# Patient Record
Sex: FEMALE | Race: WHITE | URBAN - METROPOLITAN AREA
[De-identification: names, ages, dates, MRNs, and addresses within clinical notes are randomized per-mention and may not be internally consistent; named-entity substitution may affect disease eponyms.]

---

## 2018-09-04 ENCOUNTER — HOSPITAL ENCOUNTER (OUTPATIENT)
Dept: LAB | Age: 28
Discharge: HOME | End: 2018-09-04

## 2018-09-04 PROCEDURE — 87389 HIV-1 AG W/HIV-1&-2 AB AG IA: CPT | Performed by: INTERNAL MEDICINE

## 2018-09-05 LAB
HIV 1+2 AB+HIV1 P24 AG SERPL QL IA: NONREACTIVE
HIV12 RESULT COMMENT, HHIVC: NORMAL

## 2019-02-21 ENCOUNTER — TELEPHONE (OUTPATIENT)
Dept: OBGYN CLINIC | Age: 29
End: 2019-02-21

## 2019-02-21 NOTE — TELEPHONE ENCOUNTER
Patient called back and advised that if she has blurry vision and sOB she needs to go to the ER. Patient also advised that she needs to make an appt to have the letter or she can wait until her appt.

## 2019-02-21 NOTE — TELEPHONE ENCOUNTER
Message left at 9:12AM      33year old patient to be seen on 2/27/19 for first ob visit and ultrasound      Patient left a message requesting a letter for weight restrictions for her job    Patient states on the message that she is lifting over 100 lbs and is getting dizzy and short of breath    This nurse called the patient back and was not able to leave a message due to voice mail box is full.